# Patient Record
Sex: FEMALE | Race: WHITE | Employment: FULL TIME | URBAN - METROPOLITAN AREA
[De-identification: names, ages, dates, MRNs, and addresses within clinical notes are randomized per-mention and may not be internally consistent; named-entity substitution may affect disease eponyms.]

---

## 2017-06-01 PROCEDURE — 88175 CYTOPATH C/V AUTO FLUID REDO: CPT | Performed by: OBSTETRICS & GYNECOLOGY

## 2018-06-04 PROCEDURE — 88175 CYTOPATH C/V AUTO FLUID REDO: CPT | Performed by: OBSTETRICS & GYNECOLOGY

## 2020-04-07 PROBLEM — I10 ESSENTIAL HYPERTENSION: Status: ACTIVE | Noted: 2020-04-07

## 2020-04-07 PROBLEM — F41.0 PANIC ATTACK: Status: ACTIVE | Noted: 2020-04-07

## 2020-04-07 PROBLEM — M54.9 UPPER BACK PAIN: Status: ACTIVE | Noted: 2020-04-07

## 2020-04-07 PROBLEM — R07.89 ATYPICAL CHEST PAIN: Status: ACTIVE | Noted: 2020-04-07

## 2020-04-07 PROBLEM — Z82.49 FAMILY HISTORY OF EARLY CAD: Status: ACTIVE | Noted: 2020-04-07

## 2020-05-08 PROBLEM — J30.1 SEASONAL ALLERGIC RHINITIS DUE TO POLLEN: Status: ACTIVE | Noted: 2020-05-08

## 2020-05-08 PROBLEM — R07.89 ATYPICAL CHEST PAIN: Status: RESOLVED | Noted: 2020-04-07 | Resolved: 2020-05-08

## 2021-09-16 PROBLEM — F41.1 GAD (GENERALIZED ANXIETY DISORDER): Status: ACTIVE | Noted: 2021-09-16

## 2022-01-19 PROBLEM — F10.20 ALCOHOL USE DISORDER, SEVERE, DEPENDENCE (HCC): Status: ACTIVE | Noted: 2022-01-19

## 2022-01-19 NOTE — BH LEVEL OF CARE ASSESSMENT
Crisis Evaluation Assessment    Manuela Mohr YOB: 1975   Age 48year old MRN [id number]   Location [facility] Attending Moni Cohen*      Patient's legal sex: female  Patient identifies as: female  P they have never been serious thoughts. Patient indicated she feels more hopeless and helpless with her current stressors. Patient denies any history of mental health treatment or alcohol treatment.  Patient has never had any SI attempts nor has she had any No  Collateral for any access to means/firearms/weapons: none    Protective Factors:   Protective Factors: family    Review of Psychiatric Systems:  See above    Substance Use:  See above    Functional Achievement:   See above.  Patient denies a decrease in the ED seeking detox for alcohol. Patient has been drinking 1/2-1 pt of vodka daily. Patient reports an increase of multiple stressors including work pressure, coaching stress and a miscarriage over the past six months/year.  Patient has been struggling to

## 2022-01-19 NOTE — ED PROVIDER NOTES
Patient signed out awaiting crisis worker evaluation. Plan for admission to Admission to [facility].  There were no issues during my shift

## 2022-01-19 NOTE — ED PROVIDER NOTES
Patient Seen in: [facility] Emergency Department      History   Patient presents with:  Eval-P    Stated Complaint: requesting detox from etoh. SI    Subjective:   HPI    40-year-old female presents for alcohol detox.   Patient has been drinking half bilaterally. Heart: Regular rate and rhythm. Abdomen: Soft, nontender. Skin: No rash. No edema. Neurologic: No focal neurologic deficits. Normal speech pattern  Musculoskeletal: No tenderness or deformity noted. Full range of motion throughout. [id number]  [phone]    Call            Medications Prescribed:  Current Discharge Medication List

## 2022-01-19 NOTE — ED NOTES
Pt unable to be assessed at this time due to intoxication and sedation, slurring words, easily falling asleep. Will need to be assessed on the am shift.

## 2022-01-19 NOTE — ED NOTES
Patient was accepted under Dr Coreen Matta at [facility].  RN to RN #: [phone]

## 2022-01-19 NOTE — ED QUICK NOTES
RN gave report to Dorie MARTIN at [facility]. Pt will be transported to [facility] by her .  Pt updatesd with POC

## 2022-01-19 NOTE — ED INITIAL ASSESSMENT (HPI)
Patient requesting detox from alcohol and xanax. Last drink was noon today. Patient admits to increased levels of stress. Patient admits to [place] [facility]

## 2022-01-19 NOTE — ED PROVIDER NOTES
Patient resting comfortably, awaiting [facility] crisis worker evaluation. Patient denies suicidal ideation or homicidal ideation. Patient states she did have COVID several weeks ago and is not vaccinated. Patient currently denies any COVID symptoms.

## 2022-01-19 NOTE — PROGRESS NOTES
01/19/22 [address] [place] [address]   Have you been practicing social distancing? Yes   Have you been wearing a mask when in the community? Yes   Are the people you live with following social distancing and wearing a mask?  Yes   While you are

## 2022-01-20 PROBLEM — F41.9 ANXIETY DISORDER, UNSPECIFIED: Status: ACTIVE | Noted: 2021-09-16

## 2022-01-20 PROBLEM — F39 UNSPECIFIED MOOD (AFFECTIVE) DISORDER (HCC): Status: ACTIVE | Noted: 2022-01-20

## 2022-02-21 ENCOUNTER — LAB ENCOUNTER (OUTPATIENT)
Dept: LAB | Age: 47
End: 2022-02-21
Attending: Other
Payer: COMMERCIAL